# Patient Record
Sex: FEMALE | Race: WHITE | Employment: OTHER | URBAN - METROPOLITAN AREA
[De-identification: names, ages, dates, MRNs, and addresses within clinical notes are randomized per-mention and may not be internally consistent; named-entity substitution may affect disease eponyms.]

---

## 2017-03-10 ENCOUNTER — OFFICE VISIT (OUTPATIENT)
Dept: CARDIOLOGY CLINIC | Age: 72
End: 2017-03-10

## 2017-03-10 VITALS
OXYGEN SATURATION: 97 % | BODY MASS INDEX: 28.47 KG/M2 | RESPIRATION RATE: 16 BRPM | HEART RATE: 96 BPM | WEIGHT: 150.8 LBS | SYSTOLIC BLOOD PRESSURE: 128 MMHG | DIASTOLIC BLOOD PRESSURE: 78 MMHG | HEIGHT: 61 IN

## 2017-03-10 DIAGNOSIS — I25.83 CORONARY ARTERY DISEASE DUE TO LIPID RICH PLAQUE: Primary | ICD-10-CM

## 2017-03-10 DIAGNOSIS — E11.9 CONTROLLED TYPE 2 DIABETES MELLITUS WITHOUT COMPLICATION, WITHOUT LONG-TERM CURRENT USE OF INSULIN (HCC): ICD-10-CM

## 2017-03-10 DIAGNOSIS — I25.10 CORONARY ARTERY DISEASE DUE TO LIPID RICH PLAQUE: Primary | ICD-10-CM

## 2017-03-10 DIAGNOSIS — E78.5 DYSLIPIDEMIA: ICD-10-CM

## 2017-03-10 NOTE — MR AVS SNAPSHOT
Visit Information Date & Time Provider Department Dept. Phone Encounter #  
 3/10/2017  1:20 PM Moris Angulo MD [facility] Miranda Winters [phone] [id number] Your Appointments 9/11/2017  1:00 PM  
ESTABLISHED PATIENT with Moris Angulo MD  
[facility] ([facility] SCH[facility]) Appt Note: annual  
 [address] [facility] [address]  
54 [facility] [address] [address] Upcoming Health Maintenance Date Due Hepatitis C Screening 1945 FOOT EXAM Q1 9/3/1957 MICROALBUMIN Q1 9/3/1957 EYE EXAM RETINAL OR DILATED Q1 9/3/1957 DTaP/Tdap/Td series (1 - Tdap) 9/3/1968 BREAST CANCER SCRN MAMMOGRAM 9/3/1997 FOBT Q 1 YEAR AGE 50-75 9/3/1997 ZOSTER VACCINE AGE 60> 9/3/2007 GLAUCOMA SCREENING Q2Y 9/3/2012 OSTEOPOROSIS SCREENING (DEXA) 9/3/2012 Pneumococcal 65+ Low/Medium Risk (1 of 2 - PCV13) 9/3/2012 MEDICARE YEARLY EXAM 9/3/2012 INFLUENZA AGE 9 TO ADULT 8/1/2016 HEMOGLOBIN A1C Q6M 3/6/2017 LIPID PANEL Q1 9/6/2017 Allergies as of 3/10/2017  Review Complete On: 3/10/2017 By: Moris Angulo MD  
  
 Severity Noted Reaction Type Reactions Bacitracin  09/15/2016    Itching Erythromycin  09/06/2016    Swelling Mouth swelling Current Immunizations  Reviewed on 9/7/2016 No immunizations on file. Not reviewed this visit You Were Diagnosed With   
  
 Codes Comments Coronary artery disease due to lipid rich plaque    -  Primary ICD-10-CM: I25.10, I25.83 ICD-9-CM: 414.00, 414.3 Vitals BP Pulse Resp Height(growth percentile) Weight(growth percentile) SpO2  
 128/78 (BP 1 Location: Left arm) 96 16 5' 1\" (1.549 m) 150 lb 12.8 oz (68.4 kg) 97% BMI Smoking Status 28.49 kg/m2 Never Smoker Vitals History BMI and BSA Data  Body Mass Index Body Surface Area  
 28.49 kg/m 2 1.72 m 2  
 Preferred Pharmacy Pharmacy Name Phone Luc Romano, [place] - [address] [phone] Your Updated Medication List  
  
   
This list is accurate as of: 3/10/17  1:38 PM.  Always use your most recent med list.  
  
  
  
  
 aspirin 81 mg chewable tablet Take 2 Tabs by mouth daily. atorvastatin 10 mg tablet Commonly known as:  LIPITOR  
TAKE 1 TABLET EVERY NIGHT  
  
 bumetanide 1 mg tablet Commonly known as:  Kimmy Sanders Take 1 Tab by mouth daily. carvedilol 6.25 mg tablet Commonly known as:  Juline Balzarine Take 1 Tab by mouth two (2) times daily (with meals). clopidogrel 75 mg Tab Commonly known as:  PLAVIX Take 1 Tab by mouth daily. EFFEXOR  mg capsule Generic drug:  venlafaxine-SR Take 150 mg by mouth daily. fluticasone 50 mcg/actuation nasal spray Commonly known as:  Sree Cleveland 2 Sprays by Both Nostrils route daily. LORazepam 1 mg tablet Commonly known as:  ATIVAN Take 1 mg by mouth every eight (8) hours as needed (Sleep). magnesium oxide 400 mg tablet Commonly known as:  MAG-OX Take 1 Tab by mouth two (2) times a day. metFORMIN 500 mg tablet Commonly known as:  GLUCOPHAGE Take 500 mg by mouth two (2) times daily (with meals). temazepam 30 mg capsule Commonly known as:  RESTORIL Take 30 mg by mouth nightly as needed for Sleep. We Performed the Following HEPATIC FUNCTION PANEL [49418 CPT(R)] LIPID PANEL [67299 CPT(R)] Introducing [facility] & [facility] SERVICES! Dear Trey Rosales: 
Thank you for requesting a MEETiiN account. Our records indicate that you already have an active MEETiiN account. You can access your account anytime at [email or web address] Did you know that you can access your hospital and ER discharge instructions at any time in MEETiiN? You can also review all of your test results from your hospital stay or ER visit. Additional Information If you have questions, please visit the Frequently Asked Questions section of the Presentigohart website at [email or web address] Remember, Exos is NOT to be used for urgent needs. For medical emergencies, dial 911. Now available from your iPhone and Android! Please provide this summary of care documentation to your next provider. Your primary care clinician is listed as Shannen Reza. If you have any questions after today's visit, please call [phone].

## 2017-03-10 NOTE — PROGRESS NOTES
Subjective:     Problem List  Date Reviewed: 3/10/2017          Codes Class Noted    Diabetes mellitus type 2, controlled ([facility] 75.) ICD-10-CM: E11.9  ICD-9-CM: 250.00  9/15/2016        Coronary artery disease ICD-10-CM: I25.10  ICD-9-CM: 414.00  9/8/2016    Overview Addendum 10/11/2016  1:40 PM by MD WENDY Klein. Cath (9/6/16):  LAD [id number]. LCx patent. RCA p80, m50. EF 35% with severe apical HK. B.  PCI (9/6/16): pLAD 3.0x18 Resolute, pRCA 3.5x26 Resolute. C.  Echo (9/6/16):  EF 50-55%, mild VA. Dyslipidemia ICD-10-CM: E78.5  ICD-9-CM: 272.4  9/8/2016    Overview Signed 9/8/2016 10:19 AM by MD WENDY Klein. FLP (9/6/16): Tot 175, TG 84, HDL 39,  (no Rx). Ms. Osvaldo Blount is a 73 y.o. woman with the above past medical history, who presents for follow up of her coronary artery disease. She is doing well from a cardiac standpoint. She denies any chest pain, chest discomfort, shortness of breath, dyspnea on exertion, orthopnea, paroxysmal nocturnal dyspnea, lower extremity swelling, palpitations, syncope or near syncope. She is having some mild stressors. Some of her children and grandchildren she feels are not behaving appropriately. We had a discussion with regard to exogenous stressors and their relation to coronary disease. She continues to exercise at [employer] primarily doing recumbent bicycle and some walking exercises there. History   Smoking Status    Never Smoker   Smokeless Tobacco    Not on file       Current Outpatient Prescriptions   Medication Sig Dispense Refill    atorvastatin (LIPITOR) 10 mg tablet TAKE 1 TABLET EVERY NIGHT 90 Tab 3    carvedilol (COREG) 6.25 mg tablet Take 1 Tab by mouth two (2) times daily (with meals). 180 Tab 3    bumetanide (BUMEX) 1 mg tablet Take 1 Tab by mouth daily. 90 Tab 3    clopidogrel (PLAVIX) 75 mg tablet Take 1 Tab by mouth daily.  90 Tab 3    magnesium oxide (MAG-OX) 400 mg tablet Take 1 Tab by mouth two (2) times a day. 180 Tab 3    aspirin 81 mg chewable tablet Take 2 Tabs by mouth daily.  metFORMIN (GLUCOPHAGE) 500 mg tablet Take 500 mg by mouth two (2) times daily (with meals).  venlafaxine-SR (EFFEXOR XR) 150 mg capsule Take 150 mg by mouth daily.  temazepam (RESTORIL) 30 mg capsule Take 30 mg by mouth nightly as needed for Sleep.  LORazepam (ATIVAN) 1 mg tablet Take 1 mg by mouth every eight (8) hours as needed (Sleep).  fluticasone (FLONASE) 50 mcg/actuation nasal spray 2 Sprays by Both Nostrils route daily. Objective:     Visit Vitals    /78 (BP 1 Location: Left arm)    Pulse 96    Resp 16    Ht 5' 1\" (1.549 m)    Wt 150 lb 12.8 oz (68.4 kg)    SpO2 97%    BMI 28.49 kg/m2       HEENT Exam:     Normocephalic, atraumatic. EOMI. Oropharynx negative. Neck supple. No lymphadenopathy. Lung Exam:     The patient is not dyspneic. There is no cough. The lungs are clear to percussion. Breath sounds are heard equally in all lung fields. There are no wheezes, rales, rhonchi, or rubs heard on auscultation. Heart Exam:     The rhythm is regular. The PMI is in the 5th intercostal space of the MCL. Apical impulse is normal. S1 is regular. S2 is physiologic. There is no S3, S4 gallop, murmur, click, or rub. Abdomen Exam:     Bowel sounds are normoactive. Abdomen benign. Extremities Exam:     The extremities are atraumatic appearing. There is no clubbing, cyanosis, edema, ulcers, varicose veins, rash, swelling, erythemia noted in the extremities. The neurovascular status is grossly intact with normal distal sensation and pulses. Vascular Exam:     The radial, brachial, dorsalis pedis, posterior tibial, are equal and strong bilaterally The carotids are equal bilaterally without bruits. EKG: Jeanette Acosta Assessment/Plan:     Ms. Oriana Epstein appears stable from a cardiac standpoint.   We are going to stay the course with her current medication regimen, and she is going to follow up with me in six months time at which time we will discuss stopping her Plavix, as she will be one year status post drug-eluting stent placement. We are also going to obtain a fasting lipid profile with liver enzymes in the near future. We discussed diet and exercise. Plan:  1. Continue outpatient medication regimen. 2. Follow up with me in six months time. 3. Fasting lipid profile with liver enzymes in the near future. 4. Diet and exercise. 5. Call my office, call her primary care physician, or return to the hospital should any concerning symptomatology arise. Ms. Natalie Baumgarten indicated that she understood this plan and wished to proceed ahead.              Patient Care Team:  Claudean Najjar, MD as PCP - [facility])

## 2017-03-10 NOTE — PATIENT INSTRUCTIONS
GipisharGauzy Activation    Thank you for requesting access to DoApp. Please follow the instructions below to securely access and download your online medical record. DoApp allows you to send messages to your doctor, view your test results, renew your prescriptions, schedule appointments, and more. How Do I Sign Up? 1. In your internet browser, go to [email or web address]  2. Click on the First Time User? Click Here link in the Sign In box. You will be redirect to the New Member Sign Up page. 3. Enter your DoApp Access Code exactly as it appears below. You will not need to use this code after youve completed the sign-up process. If you do not sign up before the expiration date, you must request a new code. DoApp Access Code: Activation code not generated  Current DoApp Status: Active (This is the date your DoApp access code will )    4. Enter the last four digits of your Social Security Number (xxxx) and Date of Birth (mm/dd/yyyy) as indicated and click Submit. You will be taken to the next sign-up page. 5. Create a DoApp ID. This will be your DoApp login ID and cannot be changed, so think of one that is secure and easy to remember. 6. Create a DoApp password. You can change your password at any time. 7. Enter your Password Reset Question and Answer. This can be used at a later time if you forget your password. 8. Enter your e-mail address. You will receive e-mail notification when new information is available in [address] 9. Click Sign Up. You can now view and download portions of your medical record. 10. Click the Download Summary menu link to download a portable copy of your medical information. Additional Information    If you have questions, please visit the Frequently Asked Questions section of the DoApp website at [email or web address] Remember, DoApp is NOT to be used for urgent needs. For medical emergencies, dial 911.            Learning About Coronary Artery Disease (CAD)  What is coronary artery disease? Coronary artery disease (CAD) occurs when plaque builds up in the arteries that bring oxygen-rich blood to your heart. Plaque is a fatty substance made of cholesterol, calcium, and other substances in the blood. This process is called hardening of the arteries, or atherosclerosis. What happens when you have coronary artery disease? · Plaque may narrow the coronary arteries. Narrowed arteries cause poor blood flow. This can lead to angina symptoms such as chest pain or discomfort. If blood flow is completely blocked, you could have a heart attack. · You can slow CAD and reduce the risk of future problems by making changes in your lifestyle. These include quitting smoking and eating heart-healthy foods. · Treatments for CAD, along with changes in your lifestyle, can help you live a longer and healthier life. How can you prevent coronary artery disease? · Do not smoke. It may be the best thing you can do to prevent heart disease. If you need help quitting, talk to your doctor about stop-smoking programs and medicines. These can increase your chances of quitting for good. · Be active. Get at least 30 minutes of exercise on most days of the week. Walking is a good choice. You also may want to do other activities, such as running, swimming, cycling, or playing tennis or team sports. · Eat heart-healthy foods. Eat more fruits and vegetables and less foods that contain saturated and trans fats. Limit alcohol, sodium, and sweets. · Stay at a healthy weight. Lose weight if you need to. · Manage other health problems such as diabetes, high blood pressure, and high cholesterol. · Manage stress. Stress can hurt your heart. To keep stress low, talk about your problems and feelings. Don't keep your feelings hidden. · If you have talked about it with your doctor, take a low-dose aspirin every day.  Aspirin can help certain people lower their risk of a heart attack or stroke. But taking aspirin isn't right for everyone, because it can cause serious bleeding. Do not start taking daily aspirin unless your doctor knows about it. How is coronary artery disease treated? · Your doctor will suggest that you make lifestyle changes. For example, your doctor may ask you to eat healthy foods, quit smoking, lose extra weight, and be more active. · You will have to take medicines. · Your doctor may suggest a procedure to open narrowed or blocked arteries. This is called angioplasty. Or your doctor may suggest using healthy blood vessels to create detours around narrowed or blocked arteries. This is called bypass surgery. Follow-up care is a key part of your treatment and safety. Be sure to make and go to all appointments, and call your doctor if you are having problems. It's also a good idea to know your test results and keep a list of the medicines you take. Where can you learn more? Go to http://lupe-sreedhar.info/. Enter [phone] [phone] in the search box to learn more about \"Learning About Coronary Artery Disease (CAD). \"  Current as of: January 27, 2016  Content Version: 11.1  © 9089-1558 Imperator. Care instructions adapted under license by [facility] (which disclaims liability or warranty for this information). If you have questions about a medical condition or this instruction, always ask your healthcare professional. [address] any warranty or liability for your use of this information.

## 2017-03-21 ENCOUNTER — HOSPITAL ENCOUNTER (OUTPATIENT)
Dept: LAB | Age: 72
Discharge: HOME | End: 2017-03-21
Payer: MEDICARE

## 2017-03-21 PROCEDURE — 36415 COLL VENOUS BLD VENIPUNCTURE: CPT

## 2017-03-21 PROCEDURE — 80076 HEPATIC FUNCTION PANEL: CPT

## 2017-03-21 PROCEDURE — 80061 LIPID PANEL: CPT

## 2017-03-22 LAB
ALBUMIN SERPL-MCNC: 4.4 G/DL (ref 3.6–4.8)
ALP SERPL-CCNC: 79 IU/L (ref 39–117)
ALT SERPL-CCNC: 16 IU/L (ref 0–32)
AST SERPL-CCNC: 15 IU/L (ref 0–40)
BILIRUB DIRECT SERPL-MCNC: 0.13 MG/DL (ref 0–0.4)
BILIRUB SERPL-MCNC: 0.4 MG/DL (ref 0–1.2)
CHOLEST SERPL-MCNC: 119 MG/DL (ref 100–199)
HDLC SERPL-MCNC: 47 MG/DL
INTERPRETATION, 910389: NORMAL
LDLC SERPL CALC-MCNC: 56 MG/DL (ref 0–99)
PROT SERPL-MCNC: 6.6 G/DL (ref 6–8.5)
TRIGL SERPL-MCNC: 80 MG/DL (ref 0–149)
VLDLC SERPL CALC-MCNC: 16 MG/DL (ref 5–40)

## 2017-06-19 RX ORDER — BUMETANIDE 1 MG/1
1 TABLET ORAL DAILY
Qty: 90 TAB | Refills: 3 | Status: SHIPPED | OUTPATIENT
Start: 2017-06-19 | End: 2017-06-19 | Stop reason: SDUPTHER

## 2017-06-19 RX ORDER — CLOPIDOGREL BISULFATE 75 MG/1
75 TABLET ORAL DAILY
Qty: 90 TAB | Refills: 3 | Status: SHIPPED | OUTPATIENT
Start: 2017-06-19 | End: 2017-06-19 | Stop reason: SDUPTHER

## 2017-06-19 RX ORDER — CARVEDILOL 6.25 MG/1
6.25 TABLET ORAL 2 TIMES DAILY WITH MEALS
Qty: 180 TAB | Refills: 3 | Status: SHIPPED | OUTPATIENT
Start: 2017-06-19 | End: 2017-06-19 | Stop reason: SDUPTHER

## 2017-09-13 ENCOUNTER — DOCUMENTATION ONLY (OUTPATIENT)
Dept: CARDIOLOGY CLINIC | Age: 72
End: 2017-09-13

## 2017-10-17 ENCOUNTER — OFFICE VISIT (OUTPATIENT)
Dept: CARDIOLOGY CLINIC | Age: 72
End: 2017-10-17

## 2017-10-17 VITALS
DIASTOLIC BLOOD PRESSURE: 72 MMHG | HEART RATE: 76 BPM | RESPIRATION RATE: 16 BRPM | SYSTOLIC BLOOD PRESSURE: 104 MMHG | BODY MASS INDEX: 28.09 KG/M2 | HEIGHT: 61 IN | WEIGHT: 148.8 LBS | OXYGEN SATURATION: 99 %

## 2017-10-17 DIAGNOSIS — E11.9 CONTROLLED TYPE 2 DIABETES MELLITUS WITHOUT COMPLICATION, WITHOUT LONG-TERM CURRENT USE OF INSULIN (HCC): ICD-10-CM

## 2017-10-17 DIAGNOSIS — E78.5 DYSLIPIDEMIA: ICD-10-CM

## 2017-10-17 DIAGNOSIS — I25.10 CORONARY ARTERY DISEASE INVOLVING NATIVE CORONARY ARTERY OF NATIVE HEART WITHOUT ANGINA PECTORIS: Primary | ICD-10-CM

## 2017-10-17 NOTE — MR AVS SNAPSHOT
Visit Information Date & Time Provider Department Dept. Phone Encounter #  
 10/17/2017  3:00 PM Marija Landrum MD [facility] Stephanie Fam [phone] [id number] Your Appointments 10/19/2018  2:00 PM  
ESTABLISHED PATIENT with Marija Landrum MD  
[facility] ([facility] SCH[facility]UL[facility]) [address] [facility] [address] [facility]  
[address] [facility] [address] [address] [address] Upcoming Health Maintenance Date Due Hepatitis C Screening 1945 FOOT EXAM Q1 9/3/1957 MICROALBUMIN Q1 9/3/1957 EYE EXAM RETINAL OR DILATED Q1 9/3/1957 DTaP/Tdap/Td series (1 - Tdap) 9/3/1968 BREAST CANCER SCRN MAMMOGRAM 9/3/1997 FOBT Q 1 YEAR AGE 50-75 9/3/1997 ZOSTER VACCINE AGE 60> 7/3/2007 GLAUCOMA SCREENING Q2Y 9/3/2012 OSTEOPOROSIS SCREENING (DEXA) 9/3/2012 Pneumococcal 65+ Low/Medium Risk (1 of 2 - PCV13) 9/3/2012 MEDICARE YEARLY EXAM 9/3/2012 HEMOGLOBIN A1C Q6M 3/6/2017 INFLUENZA AGE 9 TO ADULT 8/1/2017 LIPID PANEL Q1 3/21/2018 Allergies as of 10/17/2017  Review Complete On: 10/17/2017 By: Marija Landrum MD  
  
 Severity Noted Reaction Type Reactions Bacitracin  09/15/2016    Itching Erythromycin  09/06/2016    Swelling Mouth swelling Current Immunizations  Reviewed on 9/7/2016 No immunizations on file. Not reviewed this visit You Were Diagnosed With   
  
 Codes Comments Coronary artery disease involving native coronary artery of native heart without angina pectoris    -  Primary ICD-10-CM: I25.10 ICD-9-CM: 414.01 Vitals BP Pulse Resp Height(growth percentile) Weight(growth percentile) SpO2  
 104/72 (BP 1 Location: Left arm) 76 16 5' 1\" (1.549 m) 148 lb 12.8 oz (67.5 kg) 99% BMI Smoking Status 28.12 kg/m2 Never Smoker Vitals History BMI and BSA Data Body Mass Index Body Surface Area 28.12 kg/m 2 1.7 m 2 Preferred Pharmacy Pharmacy Name Phone Luc Guadalupe [address] [place] [phone] [facility] [address] [phone] Your Updated Medication List  
  
   
This list is accurate as of: 10/17/17  3:17 PM.  Always use your most recent med list.  
  
  
  
  
 aspirin 81 mg chewable tablet Take 2 Tabs by mouth daily. atorvastatin 10 mg tablet Commonly known as:  LIPITOR  
TAKE 1 TABLET EVERY NIGHT  
  
 bumetanide 1 mg tablet Commonly known as:  Chela Oas Take 1 Tab by mouth daily. carvedilol 6.25 mg tablet Commonly known as:  Sharolyn Zambrano Take 1 Tab by mouth two (2) times daily (with meals). fluticasone 50 mcg/actuation nasal spray Commonly known as:  Peter Guild 2 Sprays by Both Nostrils route daily. LORazepam 1 mg tablet Commonly known as:  ATIVAN Take 1 mg by mouth every eight (8) hours as needed (Sleep). magnesium oxide 400 mg tablet Commonly known as:  MAG-OX Take 1 Tab by mouth two (2) times a day. metFORMIN 500 mg tablet Commonly known as:  GLUCOPHAGE Take 500 mg by mouth two (2) times daily (with meals). temazepam 30 mg capsule Commonly known as:  RESTORIL Take 30 mg by mouth nightly as needed for Sleep. We Performed the Following AMB POC EKG ROUTINE W/ 12 LEADS, INTER & REP [68505 CPT(R)] Introducing hospitals & [facility] SERVICES! Dear Eliza Blandon: 
Thank you for requesting a Bitsmith Games account. Our records indicate that you already have an active Bitsmith Games account. You can access your account anytime at [email or web address] Did you know that you can access your hospital and ER discharge instructions at any time in Bitsmith Games? You can also review all of your test results from your hospital stay or ER visit. Additional Information If you have questions, please visit the Frequently Asked Questions section of the Bitsmith Games website at [email or web address] Remember, MyChart is NOT to be used for urgent needs. For medical emergencies, dial 911. Now available from your iPhone and Android! Please provide this summary of care documentation to your next provider. Your primary care clinician is listed as Betty Espinosa. If you have any questions after today's visit, please call [phone].

## 2017-10-17 NOTE — PATIENT INSTRUCTIONS
[email or web address] Activation    Thank you for requesting access to Anipipo. Please follow the instructions below to securely access and download your online medical record. Anipipo allows you to send messages to your doctor, view your test results, renew your prescriptions, schedule appointments, and more. How Do I Sign Up? 1. In your internet browser, go to [email or web address]  2. Click on the First Time User? Click Here link in the Sign In box. You will be redirect to the New Member Sign Up page. 3. Enter your Anipipo Access Code exactly as it appears below. You will not need to use this code after youve completed the sign-up process. If you do not sign up before the expiration date, you must request a new code. Anipipo Access Code: Activation code not generated  Current Anipipo Status: Active (This is the date your Anipipo access code will )    4. Enter the last four digits of your Social Security Number (xxxx) and Date of Birth (mm/dd/yyyy) as indicated and click Submit. You will be taken to the next sign-up page. 5. Create a Anipipo ID. This will be your Anipipo login ID and cannot be changed, so think of one that is secure and easy to remember. 6. Create a Anipipo password. You can change your password at any time. 7. Enter your Password Reset Question and Answer. This can be used at a later time if you forget your password. 8. Enter your e-mail address. You will receive e-mail notification when new information is available in [address] 9. Click Sign Up. You can now view and download portions of your medical record. 10. Click the Download Summary menu link to download a portable copy of your medical information. Additional Information    If you have questions, please visit the Frequently Asked Questions section of the Anipipo website at [email or web address] Remember, Anipipo is NOT to be used for urgent needs. For medical emergencies, dial 911.            Learning About Coronary Artery Disease (CAD)  What is coronary artery disease? Coronary artery disease (CAD) occurs when plaque builds up in the arteries that bring oxygen-rich blood to your heart. Plaque is a fatty substance made of cholesterol, calcium, and other substances in the blood. This process is called hardening of the arteries, or atherosclerosis. What happens when you have coronary artery disease? · Plaque may narrow the coronary arteries. Narrowed arteries cause poor blood flow. This can lead to angina symptoms such as chest pain or discomfort. If blood flow is completely blocked, you could have a heart attack. · You can slow CAD and reduce the risk of future problems by making changes in your lifestyle. These include quitting smoking and eating heart-healthy foods. · Treatments for CAD, along with changes in your lifestyle, can help you live a longer and healthier life. How can you prevent coronary artery disease? · Do not smoke. It may be the best thing you can do to prevent heart disease. If you need help quitting, talk to your doctor about stop-smoking programs and medicines. These can increase your chances of quitting for good. · Be active. Get at least 30 minutes of exercise on most days of the week. Walking is a good choice. You also may want to do other activities, such as running, swimming, cycling, or playing tennis or team sports. · Eat heart-healthy foods. Eat more fruits and vegetables and less foods that contain saturated and trans fats. Limit alcohol, sodium, and sweets. · Stay at a healthy weight. Lose weight if you need to. · Manage other health problems such as diabetes, high blood pressure, and high cholesterol. · Manage stress. Stress can hurt your heart. To keep stress low, talk about your problems and feelings. Don't keep your feelings hidden. · If you have talked about it with your doctor, take a low-dose aspirin every day.  Aspirin can help certain people lower their risk of a heart attack or stroke. But taking aspirin isn't right for everyone, because it can cause serious bleeding. Do not start taking daily aspirin unless your doctor knows about it. How is coronary artery disease treated? · Your doctor will suggest that you make lifestyle changes. For example, your doctor may ask you to eat healthy foods, quit smoking, lose extra weight, and be more active. · You will have to take medicines. · Your doctor may suggest a procedure to open narrowed or blocked arteries. This is called angioplasty. Or your doctor may suggest using healthy blood vessels to create detours around narrowed or blocked arteries. This is called bypass surgery. Follow-up care is a key part of your treatment and safety. Be sure to make and go to all appointments, and call your doctor if you are having problems. It's also a good idea to know your test results and keep a list of the medicines you take. Where can you learn more? Go to http://lupe-sreedhar.info/. Enter ([phone] 1791 2716 in the search box to learn more about \"Learning About Coronary Artery Disease (CAD). \"  Current as of: December 28, 2016  Content Version: 11.3  © 2438-5905 GemShare. Care instructions adapted under license by [employer] (which disclaims liability or warranty for this information). If you have questions about a medical condition or this instruction, always ask your healthcare professional. [address] any warranty or liability for your use of this information.

## 2017-10-17 NOTE — PROGRESS NOTES
Subjective:     Problem List  Date Reviewed: 10/17/2017          Codes Class Noted    Diabetes mellitus type 2, controlled ([facility] ICD-10-CM: E11.9  ICD-9-CM: 250.00  9/15/2016        Coronary artery disease ICD-10-CM: I25.10  ICD-9-CM: 414.00  9/8/2016    Overview Addendum 10/11/2016  1:40 PM by Kitty Landers MD     A. Cath (9/6/16):  LAD [id number]. LCx patent. RCA p80, m50. EF 35% with severe apical HK. B.  PCI (9/6/16): pLAD 3.0x18 Resolute, pRCA 3.5x26 Resolute. C.  Echo (9/6/16):  EF 50-55%, mild OH. Dyslipidemia ICD-10-CM: E78.5  ICD-9-CM: 272.4  9/8/2016    Overview Addendum 3/22/2017  8:14 AM by Kitty Landers MD     A. FLP (9/6/16): Tot 175, TG 84, HDL 39,  (no Rx). B.  FLP (3/21/17): Tot 119, TG 80, HDL 47, LDL 56 (Lipitor 10). Ms. Heri Odom is a 81 y.o. woman with the above past medical history, who presents for follow up of her coronary artery disease. She is physically doing very well. [place] to go [place] and does a recumbent bicycle there and goes for walks without any significant cardiopulmonary symptoms. She also continues to have a lot of exogenous stressors causing some anxiety. These are primarily her grandchildren, who are apparently not behaving appropriately. She otherwise is feeling well from a physical standpoint. History   Smoking Status    Never Smoker   Smokeless Tobacco    Not on file       Current Outpatient Prescriptions   Medication Sig Dispense Refill    carvedilol (COREG) 6.25 mg tablet Take 1 Tab by mouth two (2) times daily (with meals). 180 Tab 3    bumetanide (BUMEX) 1 mg tablet Take 1 Tab by mouth daily. 90 Tab 3    atorvastatin (LIPITOR) 10 mg tablet TAKE 1 TABLET EVERY NIGHT 90 Tab 3    magnesium oxide (MAG-OX) 400 mg tablet Take 1 Tab by mouth two (2) times a day. 180 Tab 3    aspirin 81 mg chewable tablet Take 2 Tabs by mouth daily.       metFORMIN (GLUCOPHAGE) 500 mg tablet Take 500 mg by mouth two (2) times daily (with meals).  temazepam (RESTORIL) 30 mg capsule Take 30 mg by mouth nightly as needed for Sleep.  LORazepam (ATIVAN) 1 mg tablet Take 1 mg by mouth every eight (8) hours as needed (Sleep).  fluticasone (FLONASE) 50 mcg/actuation nasal spray 2 Sprays by Both Nostrils route daily. Objective:     Visit Vitals    /72 (BP 1 Location: Left arm)    Pulse 76    Resp 16    Ht 5' 1\" (1.549 m)    Wt 148 lb 12.8 oz (67.5 kg)    SpO2 99%    BMI 28.12 kg/m2       HEENT Exam:     Normocephalic, atraumatic. EOMI. Oropharynx negative. Neck supple. No lymphadenopathy. Lung Exam:     The patient is not dyspneic. There is no cough. The lungs are clear to percussion. Breath sounds are heard equally in all lung fields. There are no wheezes, rales, rhonchi, or rubs heard on auscultation. Heart Exam:     The rhythm is regular. The PMI is in the 5th intercostal space of the MCL. Apical impulse is normal. S1 is regular. S2 is physiologic. There is no S3, S4 gallop, murmur, click, or rub. Abdomen Exam:     Bowel sounds are normoactive. Abdomen benign. Extremities Exam:     The extremities are atraumatic appearing. There is no clubbing, cyanosis, edema, ulcers, varicose veins, rash, erythemia noted in the extremities. The neurovascular status is grossly intact with normal distal sensation and pulses. Vascular Exam:     The radial, brachial, dorsalis pedis, posterior tibial, are equal and strong bilaterally The carotids are equal bilaterally without bruits. EKG: NSR @ 76, no isch. Assessment/Plan:     Ms. Cory Greenberg appears stable from a cardiac standpoint. We are going to stay the course with her current medication regimen, and she is going to follow up with us in one year's time. We discussed diet and exercise. Plan:  1. Continue outpatient medication regimen. 2. Follow up in one year's time.     3. Diet and exercise. 4. Call my office, call her primary care physician, or return to the hospital should any concerning symptomatology arise. Ms. Aracely Sotelo indicated that she understood this plan and wished to proceed ahead.              Patient Care Team:  Nilo Neil MD as PCP - [facility])

## 2022-12-09 ENCOUNTER — ANESTHESIA (OUTPATIENT)
Dept: ENDOSCOPY | Age: 77
End: 2022-12-09
Payer: MEDICARE

## 2022-12-09 ENCOUNTER — HOSPITAL ENCOUNTER (OUTPATIENT)
Age: 77
Setting detail: OUTPATIENT SURGERY
Discharge: HOME | End: 2022-12-09
Attending: SPECIALIST | Admitting: SPECIALIST
Payer: MEDICARE

## 2022-12-09 ENCOUNTER — ANESTHESIA EVENT (OUTPATIENT)
Dept: ENDOSCOPY | Age: 77
End: 2022-12-09
Payer: MEDICARE

## 2022-12-09 VITALS
HEART RATE: 73 BPM | RESPIRATION RATE: 17 BRPM | SYSTOLIC BLOOD PRESSURE: 161 MMHG | WEIGHT: 155.87 LBS | DIASTOLIC BLOOD PRESSURE: 75 MMHG | TEMPERATURE: 97.8 F | BODY MASS INDEX: 28.68 KG/M2 | HEIGHT: 62 IN | OXYGEN SATURATION: 100 %

## 2022-12-09 PROCEDURE — 74011250636 HC RX REV CODE- 250/636: Performed by: NURSE ANESTHETIST, CERTIFIED REGISTERED

## 2022-12-09 PROCEDURE — 76060000031 HC ANESTHESIA FIRST 0.5 HR: Performed by: SPECIALIST

## 2022-12-09 PROCEDURE — 88305 TISSUE EXAM BY PATHOLOGIST: CPT

## 2022-12-09 PROCEDURE — 76040000019: Performed by: SPECIALIST

## 2022-12-09 PROCEDURE — 2709999900 HC NON-CHARGEABLE SUPPLY: Performed by: SPECIALIST

## 2022-12-09 PROCEDURE — 77030013992 HC SNR POLYP ENDOSC BSC -B: Performed by: SPECIALIST

## 2022-12-09 RX ORDER — DEXTROMETHORPHAN/PSEUDOEPHED 2.5-7.5/.8
1.2 DROPS ORAL
Status: DISCONTINUED | OUTPATIENT
Start: 2022-12-09 | End: 2022-12-09 | Stop reason: HOSPADM

## 2022-12-09 RX ORDER — ASCORBIC ACID 500 MG
1000 TABLET ORAL
COMMUNITY

## 2022-12-09 RX ORDER — FENTANYL CITRATE 50 UG/ML
25 INJECTION, SOLUTION INTRAMUSCULAR; INTRAVENOUS AS NEEDED
Status: DISCONTINUED | OUTPATIENT
Start: 2022-12-09 | End: 2022-12-09 | Stop reason: HOSPADM

## 2022-12-09 RX ORDER — NALOXONE HYDROCHLORIDE 0.4 MG/ML
0.4 INJECTION, SOLUTION INTRAMUSCULAR; INTRAVENOUS; SUBCUTANEOUS
Status: DISCONTINUED | OUTPATIENT
Start: 2022-12-09 | End: 2022-12-09 | Stop reason: HOSPADM

## 2022-12-09 RX ORDER — VENLAFAXINE 75 MG/1
25 TABLET ORAL 3 TIMES DAILY
COMMUNITY

## 2022-12-09 RX ORDER — MIDAZOLAM HYDROCHLORIDE 1 MG/ML
.25-5 INJECTION, SOLUTION INTRAMUSCULAR; INTRAVENOUS AS NEEDED
Status: DISCONTINUED | OUTPATIENT
Start: 2022-12-09 | End: 2022-12-09 | Stop reason: HOSPADM

## 2022-12-09 RX ORDER — FLUMAZENIL 0.1 MG/ML
0.2 INJECTION INTRAVENOUS
Status: DISCONTINUED | OUTPATIENT
Start: 2022-12-09 | End: 2022-12-09 | Stop reason: HOSPADM

## 2022-12-09 RX ORDER — SODIUM CHLORIDE 9 MG/ML
50 INJECTION, SOLUTION INTRAVENOUS CONTINUOUS
Status: DISCONTINUED | OUTPATIENT
Start: 2022-12-09 | End: 2022-12-09 | Stop reason: HOSPADM

## 2022-12-09 RX ORDER — CHOLECALCIFEROL TAB 125 MCG (5000 UNIT) 125 MCG
TAB ORAL DAILY
COMMUNITY

## 2022-12-09 RX ORDER — SODIUM CHLORIDE 9 MG/ML
INJECTION, SOLUTION INTRAVENOUS
Status: DISCONTINUED | OUTPATIENT
Start: 2022-12-09 | End: 2022-12-09 | Stop reason: HOSPADM

## 2022-12-09 RX ORDER — PROPOFOL 10 MG/ML
INJECTION, EMULSION INTRAVENOUS
Status: DISCONTINUED | OUTPATIENT
Start: 2022-12-09 | End: 2022-12-09 | Stop reason: HOSPADM

## 2022-12-09 RX ADMIN — PROPOFOL 100 MCG/KG/MIN: 10 INJECTION, EMULSION INTRAVENOUS at 09:25

## 2022-12-09 RX ADMIN — SODIUM CHLORIDE: 900 INJECTION, SOLUTION INTRAVENOUS at 09:21

## 2022-12-09 NOTE — DISCHARGE INSTRUCTIONS
[address] [facility] HERO Ramirez MD  [phone]      December 9, 2022    Mayo Tang  YOB: 1945    COLONOSCOPY DISCHARGE INSTRUCTIONS    If there is redness at IV site you should apply warm compress to area. If redness or soreness persist contact Dr. Sherry Ramirez' or your primary care doctor. There may be a slight amount of blood passed from the rectum. Gaseous discomfort may develop, but walking, belching will help relieve this. You may not operate a vehicle for 12 hours  You may not operate machinery or dangerous appliances for rest of today  You may not drink alcoholic beverages for 12 hours  Avoid making any critical decisions for 24 hours    DIET:  You may resume your normal diet, but some patients find that heavy or large meals may lead to indigestion or vomiting. I suggest a light meal as first food intake. MEDICATIONS:  The use of some over-the-counter pain medication may lead to bleeding after colon biopsies or polyp removal.  Tylenol (also called acetaminophen) is safe to take even if you have had colonoscopy with polyp removal.  Based on the procedure you had today you may not safely take aspirin or aspirin-like products for the next ten (10) days. Remember that Tylenol (also called acetaminophen) is safe to take after colonoscopy even if you have had biopsies or polyps removed. ACTIVITY:  You may resume your normal household activities, but it is recommended that you spend the remainder of the day resting -  avoid any strenuous activity. CALL DR. Heidy Awad' OFFICE IF:  Increasing pain, nausea, vomiting  Abdominal distension (swelling)  Significant new or increased bleeding (oral or rectal)  Fever/Chills  Chest pain/shortness of breath                       Additional instructions:   No aspirin 10 days. We found no colon cancer but removed two polyps.   I'll send you a letter with those results in 10-14 days. It was an honor to be your doctor today. Please let me or my office staff know if you have any feedback about today's procedure. Paul Sahni MD    Colonoscopy saves lives, and can prevent colon cancer. Everyone aged 48 or older needs colonoscopy.   Tell your family and friends: get the test!

## 2022-12-09 NOTE — INTERVAL H&P NOTE
Pre-Endoscopy H&P Update  Chief complaint/HPI/ROS:  The indication for the procedure, the patient's history and the patient's current medications are reviewed prior to the procedure and that data is reported on the H&P to which this document is attached. Any significant complaints with regard to organ systems will be noted, and if not mentioned then a review of systems is not contributory. Past Medical History:   Diagnosis Date    Breast CA [facility]) 2001    right, lumpectomy and radiation and chemo    Cancer ([facility] 1998    ovarian- surgery and chemo    Cardiogenic shock ([facility] 09/06/2016    Post MI  IABP    Coronary artery disease 09/08/2016    A. Cath (9/6/16):  LAD [id number]. LCx patent. RCA p80, m50. EF 35% with severe apicak HK. B.  PCI (9/6/16): pLAD 3.0x18 Resolute, pRCA 3.5x26 Resolute. C.  Echo (9/6/16):  EF 50-55%, mild CT. Coronary artery disease involving native coronary artery with unstable angina pectoris ([facility] 09/06/2016    CATH 9/6/2016: oLAD 100%. LCX, pRCA 90%, EF 30%, LVEDP 35  ----- CATHY (3x15 Resolute)-> LAD, CATHY (3.5 x 26 Resolute) -> RCA  ---IABP --- PA line PCWP 11  .     Diabetes ([facility]     Dyslipidemia 09/08/2016    A. FLP (9/6/16): Tot 175, TG 84, HDL 39,  (no Rx). Hypertension       Past Surgical History:   Procedure Laterality Date    HX APPENDECTOMY      HX CHOLECYSTECTOMY  1977    HX HYSTERECTOMY      CT BREAST SURGERY PROCEDURE UNLISTED Right 2002    breat lumpectomy     Social   Social History     Tobacco Use    Smoking status: Never    Smokeless tobacco: Not on file   Substance Use Topics    Alcohol use: No      Family History   Problem Relation Age of Onset    No Known Problems Mother       Allergies   Allergen Reactions    Erythromycin Swelling     Mouth swelling    Bacitracin Itching      Prior to Admission Medications   Prescriptions Last Dose Informant Patient Reported? Taking?    LORazepam (ATIVAN) 1 mg tablet 12/7/2022 Self Yes Yes   Sig: Take 2 mg by mouth every eight (8) hours as needed (Sleep). ascorbic acid, vitamin C, (Vitamin C) 500 mg tablet 12/8/2022  Yes Yes   Sig: Take 1,000 mg by mouth. aspirin 81 mg chewable tablet 12/7/2022  Yes No   Sig: Take 2 Tabs by mouth daily. atorvastatin (LIPITOR) 10 mg tablet 12/8/2022  No Yes   Sig: TAKE 1 TABLET EVERY NIGHT   bumetanide (BUMEX) 1 mg tablet 12/8/2022  No Yes   Sig: Take 1 Tab by mouth daily. carvedilol (COREG) 6.25 mg tablet 12/8/2022  No Yes   Sig: Take 1 Tab by mouth two (2) times daily (with meals). cholecalciferol (Vitamin D3) (5000 Units/125 mcg) tab tablet 12/8/2022  Yes Yes   Sig: Take  by mouth daily. magnesium oxide (MAG-OX) 400 mg tablet 12/8/2022  No Yes   Sig: Take 1 Tab by mouth two (2) times a day. metFORMIN (GLUCOPHAGE) 500 mg tablet 12/8/2022 Self Yes Yes   Sig: Take 500 mg by mouth two (2) times daily (with meals). temazepam (RESTORIL) 30 mg capsule 12/7/2022 Self Yes Yes   Sig: Take 30 mg by mouth nightly as needed for Sleep.   venlafaxine (EFFEXOR) 75 mg tablet 12/8/2022  Yes Yes   Sig: Take 25 mg by mouth three (3) times daily. zinc 50 mg tab tablet 12/8/2022  Yes Yes   Sig: Take 50 mg by mouth daily. Facility-Administered Medications: None       PHYSICAL EXAM:  The patient is examined immediately prior to the procedure. Visit Vitals  BP (!) 150/65   Pulse 90   Temp 97.7 °F (36.5 °C)   Resp 14   Ht 5' 2\" (1.575 m)   Wt 70.7 kg (155 lb 13.8 oz)   SpO2 99%   Breastfeeding No   BMI 28.51 kg/m²     Gen: Appears comfortable, no distress. Pulm: comfortable respirations with no abnormal audible breath sounds  HEART: well perfused, no abnormal audible heart sounds  GI: abdomen flat. PLAN:  Informed consent discussion held, patient afforded an opportunity to ask questions and all questions answered. After being advised of the risks, benefits, and alternatives, the patient requested that we proceed and indicated so on a written consent form.       Will proceed with procedure as planned.   Christa Hernández MD

## 2022-12-09 NOTE — PROCEDURES
[address] [facility] HERO Perry MD  [phone]      2022    Colonoscopy Procedure Note  Para Favorite  :  1945  Kevin Medical Record Number: [id number]    Indications:     Personal history of colon polyps (screening only)  PCP:  Saurav, MD [facility]  Anesthesia/Sedation: Conscious Sedation/Moderate Sedation/GETA, see notes  Endoscopist:  Dr. Angela Ernst  Complications:  None  Estimated Blood Loss:  None    Permit:  The indications, risks, benefits and alternatives were reviewed with the patient or their decision maker who was provided an opportunity to ask questions and all questions were answered. The specific risks of colonoscopy with conscious sedation were reviewed, including but not limited to anesthetic complication, bleeding, adverse drug reaction, missed lesion, infection, IV site reactions, and intestinal perforation which would lead to the need for surgical repair. Alternatives to colonoscopy including radiographic imaging, observation without testing, or laboratory testing were reviewed including the limitations of those alternatives. After considering the options and having all their questions answered, the patient or their decision maker provided both verbal and written consent to proceed. Procedure in Detail:  After obtaining informed consent, positioning of the patient in the left lateral decubitus position, and conduction of a pre-procedure pause or \"time out\" the endoscope was introduced into the anus and advanced to the cecum, which was identified by the ileocecal valve and appendiceal orifice. The quality of the colonic preparation was good. A careful inspection was made as the colonoscope was withdrawn, findings and interventions are described below.     Findings:   Two small polyps transverse and sigmoid 5mm each, both removed with cold snare, retrieved, and hemostasis confirmed. In the rectum, medium internal hemorrhoids are noted without bleeding. Specimens:    See above    Complications:   None; patient tolerated the procedure well. Impression:  Polyps, hemorrhoids. Recommendations:     - Await pathology. Thank you for entrusting me with this patient's care. Please do not hesitate to contact me with any questions or if I can be of assistance with any of your other patients' GI needs. Signed By: Kim Hill MD                        December 9, 2022      Surgical assistant none. Implants none unless specified.

## 2022-12-09 NOTE — PERIOP NOTES
Report from [facility], [address] [place] [address], see anesthesia record. ABD remains soft and non-tender post procedure. Pt has no complaints at this time and tolerated the procedure well. Endoscope was pre-cleaned at bedside immediately following procedure by Adilene Patel.

## 2022-12-09 NOTE — H&P
78 y.o. female for open access colonoscopy for screening   Additional data for completion of the targeted pre-endoscopy H&P will be provided under 'H&P interval notes'. Please see that document which will be attached to this.   Kim Hill MD  Last [facility] 2017 small adenoma

## 2022-12-09 NOTE — ANESTHESIA POSTPROCEDURE EVALUATION
Procedure(s):  COLONOSCOPY  ENDOSCOPIC POLYPECTOMY.     MAC    Anesthesia Post Evaluation        Patient location during evaluation: bedside  Level of consciousness: awake  Pain management: satisfactory to patient  Airway patency: patent  Anesthetic complications: no  Cardiovascular status: acceptable  Respiratory status: acceptable  Hydration status: acceptable        INITIAL Post-op Vital signs:   Vitals Value Taken Time   /75 12/09/22 1007   Temp 36.6 °C (97.8 °F) 12/09/22 0955   Pulse 73 12/09/22 1007   Resp 17 12/09/22 1007   SpO2 100 % 12/09/22 1007

## 2024-12-08 ENCOUNTER — APPOINTMENT (OUTPATIENT)
Facility: HOSPITAL | Age: 79
End: 2024-12-08
Payer: MEDICARE

## 2024-12-08 ENCOUNTER — HOSPITAL ENCOUNTER (EMERGENCY)
Facility: HOSPITAL | Age: 79
Discharge: HOME | End: 2024-12-08
Attending: STUDENT IN AN ORGANIZED HEALTH CARE EDUCATION/TRAINING PROGRAM
Payer: MEDICARE

## 2024-12-08 ENCOUNTER — HOSPITAL ENCOUNTER (EMERGENCY)
Facility: HOSPITAL | Age: 79
Discharge: HOME | End: 2024-12-11
Payer: MEDICARE

## 2024-12-08 VITALS
RESPIRATION RATE: 16 BRPM | TEMPERATURE: 98 F | DIASTOLIC BLOOD PRESSURE: 60 MMHG | WEIGHT: 160.05 LBS | OXYGEN SATURATION: 96 % | SYSTOLIC BLOOD PRESSURE: 99 MMHG | HEART RATE: 84 BPM | HEIGHT: 62 IN | BODY MASS INDEX: 29.45 KG/M2

## 2024-12-08 DIAGNOSIS — T07.XXXA MULTIPLE ABRASIONS: ICD-10-CM
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
## 2024-12-08 PROCEDURE — 72125 CT NECK SPINE W/O DYE: CPT

## 2024-12-08 PROCEDURE — 73562 X-RAY EXAM OF KNEE 3: CPT

## 2024-12-08 PROCEDURE — 70450 CT HEAD/BRAIN W/O DYE: CPT

## 2024-12-08 PROCEDURE — 90714 TD VACC NO PRESV 7 YRS+ IM: CPT | Performed by: STUDENT IN AN ORGANIZED HEALTH CARE EDUCATION/TRAINING PROGRAM

## 2024-12-08 PROCEDURE — 90471 IMMUNIZATION ADMIN: CPT | Performed by: STUDENT IN AN ORGANIZED HEALTH CARE EDUCATION/TRAINING PROGRAM

## 2024-12-08 PROCEDURE — 73130 X-RAY EXAM OF HAND: CPT

## 2024-12-08 PROCEDURE — 99284 EMERGENCY DEPT VISIT MOD MDM: CPT

## 2024-12-08 PROCEDURE — 6360000002 HC RX W HCPCS: Performed by: STUDENT IN AN ORGANIZED HEALTH CARE EDUCATION/TRAINING PROGRAM

## 2024-12-08 RX ORDER — MUPIROCIN 20 MG/G
OINTMENT TOPICAL 2 TIMES DAILY
Status: DISCONTINUED | OUTPATIENT
Start: 2024-12-08 | End: 2024-12-08 | Stop reason: HOSPADM

## 2024-12-08 RX ORDER — TRAZODONE HYDROCHLORIDE 150 MG/1
150 TABLET ORAL NIGHTLY
COMMUNITY

## 2024-12-08 RX ADMIN — CLOSTRIDIUM TETANI TOXOID ANTIGEN (FORMALDEHYDE INACTIVATED) AND CORYNEBACTERIUM DIPHTHERIAE TOXOID ANTIGEN (FORMALDEHYDE INACTIVATED) 0.5 ML: 5; 2 INJECTION, SUSPENSION INTRAMUSCULAR at 14:24

## 2024-12-08 ASSESSMENT — PAIN DESCRIPTION - DESCRIPTORS: DESCRIPTORS: ACHING

## 2024-12-08 ASSESSMENT — LIFESTYLE VARIABLES
HOW OFTEN DO YOU HAVE A DRINK CONTAINING ALCOHOL: NEVER
HOW MANY STANDARD DRINKS CONTAINING ALCOHOL DO YOU HAVE ON A TYPICAL DAY: PATIENT DOES NOT DRINK

## 2024-12-08 ASSESSMENT — PAIN DESCRIPTION - ORIENTATION: ORIENTATION: RIGHT

## 2024-12-08 ASSESSMENT — PAIN - FUNCTIONAL ASSESSMENT: PAIN_FUNCTIONAL_ASSESSMENT: ACTIVITIES ARE NOT PREVENTED

## 2024-12-08 ASSESSMENT — PAIN DESCRIPTION - PAIN TYPE: TYPE: ACUTE PAIN

## 2024-12-08 ASSESSMENT — PAIN SCALES - GENERAL: PAINLEVEL_OUTOF10: 6

## 2024-12-08 ASSESSMENT — PAIN DESCRIPTION - FREQUENCY: FREQUENCY: CONTINUOUS

## 2024-12-08 ASSESSMENT — PAIN DESCRIPTION - LOCATION: LOCATION: KNEE

## 2024-12-08 NOTE — DISCHARGE INSTRUCTIONS
Take Tylenol 1000 mg every 8 hours for pain. Elevate the left leg as much as possible. Leave the dressings on for 2 days and then cleanse daily with baby soap, apply non adherent dressing and wrap. Do not wrap the dressings too tightly.

## 2024-12-08 NOTE — ED TRIAGE NOTES
Pt ambulated to the treatment area. Pt states \" I fell down the front porch steps today scraped my head, knees and have an injury to the right thumb and it will not stop bleeding. I did not pass out.\"

## 2024-12-08 NOTE — ED PROVIDER NOTES
[facility] EMERGENCY DEPT  EMERGENCY DEPARTMENT ENCOUNTER      Pt Name: Myriam Calhoun  MRN: [id number]  Birthdate 1945  Date of evaluation: 12/8/2024  Provider: Michelle Odell PA-C    CHIEF COMPLAINT       Chief Complaint   Patient presents with    Fall    Laceration         HISTORY OF PRESENT ILLNESS    HPI  Patient is a 79 y.o. female who presents today with complaints of bilateral knee pain, right first digit pain after fall.  States just prior to arrival, she was trying to hang a wreath on her front door.  She lost her footing and fell down 2 steps.  She did not hit her head, no loss of consciousness.  Reports scattered abrasions and bleeding to her right first digit. Denies any confusion, syncope, seizure, no focal weakness, no facial droop, no change in gait, no loss of bowel bladder, no urinary retention, no paresthesias or numbness in extremities, no changes to vision blurry vision or change in visual acuity.  She is not on anticoagulation.    Nursing Notes were reviewed.      REVIEW OF SYSTEMS       Review of Systems   Musculoskeletal:         Bilateral knee pain   Skin:  Positive for wound.       Except as noted above the remainder of the review of systems was reviewed and negative.       PAST MEDICAL HISTORY     Past Medical History:   Diagnosis Date    Breast CA ([facility]) 2001    right, lumpectomy and radiation and chemo    Cancer ([facility]) 1998    ovarian- surgery and chemo    Cardiogenic shock 09/06/2016    Post MI  IABP    Coronary artery disease 09/08/2016    A.  Cath (9/6/16):  LAD [id number].  LCx patent.  RCA p80, m50.  EF 35% with severe apicak HK. B.  PCI (9/6/16): pLAD 3.0x18 Resolute, pRCA 3.5x26 Resolute. C.  Echo (9/6/16):  EF 50-55%, mild NE.    Coronary artery disease involving native coronary artery with unstable angina pectoris ([facility]) 09/06/2016    CATH 9/6/2016: oLAD 100%. LCX, pRCA 90%, EF 30%, LVEDP 35  ----- HAYDEN (3x15 Resolute)-> LAD, HAYDEN (3.5 x 26 Resolute) -> RCA  ---IABP --- PA line PCWP

## (undated) DEVICE — 3M™ CUROS™ DISINFECTING CAP FOR NEEDLELESS CONNECTORS 270/CARTON 20 CARTONS/CASE CFF1-270: Brand: CUROS™

## (undated) DEVICE — POLYP TRAP: Brand: TRAPEASE®

## (undated) DEVICE — BAG SPEC BIOHZRD 10 X 10 IN --

## (undated) DEVICE — CANN NASAL O2 CAPNOGRAPHY AD -- FILTERLINE

## (undated) DEVICE — SIMPLICITY FLUFF UNDERPAD 23X36, MODERATE: Brand: SIMPLICITY

## (undated) DEVICE — KIT COLON W/ 1.1OZ LUB AND 2 END

## (undated) DEVICE — BAG BELONG PT PERS CLEAR HANDL

## (undated) DEVICE — SOLIDIFIER MEDC 1200ML -- CONVERT TO 356117

## (undated) DEVICE — CUFF RMFG BP INF SZ 11 DISP -- LAWSON OEM ITEM 238915

## (undated) DEVICE — SYRINGE 50ML E/T

## (undated) DEVICE — Device

## (undated) DEVICE — CONTAINER SPEC 20 ML LID NEUT BUFF FORMALIN 10 % POLYPR STS

## (undated) DEVICE — ELECTRODE,RADIOTRANSLUCENT,FOAM,3PK: Brand: MEDLINE

## (undated) DEVICE — SET ADMIN 16ML TBNG L100IN 2 Y INJ SITE IV PIGGY BK DISP (ORDER IN MULIPLES OF 48)

## (undated) DEVICE — SNARE ENDOSCP M L240CM W27MM SHTH DIA2.4MM CHN 2.8MM OVL

## (undated) DEVICE — CATH IV AUTOGRD BC PNK 20GA 25 -- INSYTE

## (undated) DEVICE — 1200 GUARD II KIT W/5MM TUBE W/O VAC TUBE: Brand: GUARDIAN

## (undated) DEVICE — (D)SENSOR RMFG 02 PULS OXMTR -- DISC BY MFR USE ITEM 133445